# Patient Record
Sex: MALE | Race: OTHER | HISPANIC OR LATINO
[De-identification: names, ages, dates, MRNs, and addresses within clinical notes are randomized per-mention and may not be internally consistent; named-entity substitution may affect disease eponyms.]

---

## 2021-06-17 PROBLEM — K80.20 CHOLELITHIASIS: Status: ACTIVE | Noted: 2021-06-17

## 2021-06-17 PROBLEM — Z00.00 ENCOUNTER FOR PREVENTIVE HEALTH EXAMINATION: Status: ACTIVE | Noted: 2021-06-17

## 2021-06-21 ENCOUNTER — APPOINTMENT (OUTPATIENT)
Dept: SURGERY | Facility: CLINIC | Age: 61
End: 2021-06-21
Payer: MEDICAID

## 2021-06-21 VITALS
SYSTOLIC BLOOD PRESSURE: 149 MMHG | WEIGHT: 182 LBS | HEART RATE: 80 BPM | BODY MASS INDEX: 26.96 KG/M2 | HEIGHT: 69 IN | DIASTOLIC BLOOD PRESSURE: 82 MMHG

## 2021-06-21 DIAGNOSIS — K80.20 CALCULUS OF GALLBLADDER W/OUT CHOLECYSTITIS W/OUT OBSTRUCTION: ICD-10-CM

## 2021-06-21 PROCEDURE — 99204 OFFICE O/P NEW MOD 45 MIN: CPT

## 2021-06-21 RX ORDER — OMEPRAZOLE 20 MG/1
20 CAPSULE, DELAYED RELEASE ORAL
Qty: 30 | Refills: 0 | Status: ACTIVE | COMMUNITY
Start: 2021-03-07

## 2021-06-21 RX ORDER — LOSARTAN POTASSIUM AND HYDROCHLOROTHIAZIDE 25; 100 MG/1; MG/1
100-25 TABLET ORAL
Qty: 30 | Refills: 0 | Status: ACTIVE | COMMUNITY
Start: 2020-11-29

## 2021-06-21 RX ORDER — MOMETASONE FUROATE 1 MG/ML
0.1 SOLUTION TOPICAL
Qty: 30 | Refills: 0 | Status: ACTIVE | COMMUNITY
Start: 2021-05-27

## 2021-06-21 RX ORDER — AMLODIPINE BESYLATE 5 MG/1
5 TABLET ORAL
Qty: 30 | Refills: 0 | Status: ACTIVE | COMMUNITY
Start: 2021-06-05

## 2021-06-21 RX ORDER — ATORVASTATIN CALCIUM 80 MG/1
80 TABLET, FILM COATED ORAL
Qty: 30 | Refills: 0 | Status: ACTIVE | COMMUNITY
Start: 2020-11-29

## 2021-06-21 RX ORDER — OMEGA-3-ACID ETHYL ESTERS CAPSULES 1 G/1
1 CAPSULE, LIQUID FILLED ORAL
Qty: 120 | Refills: 0 | Status: ACTIVE | COMMUNITY
Start: 2020-11-29

## 2021-06-21 RX ORDER — FENOFIBRATE 200 MG/1
200 CAPSULE ORAL
Qty: 30 | Refills: 0 | Status: ACTIVE | COMMUNITY
Start: 2021-06-05

## 2021-06-21 RX ORDER — ERGOCALCIFEROL 1.25 MG/1
1.25 MG CAPSULE, LIQUID FILLED ORAL
Qty: 4 | Refills: 0 | Status: ACTIVE | COMMUNITY
Start: 2020-11-29

## 2021-06-21 NOTE — PLAN
[FreeTextEntry1] : Mr. NILSON THAKKAR  was told significance of findings, options, risks and benefits were explained.  Informed consent for laparoscopic/possible open  cholecystectomy and umbilical hernia repair  and potential risks, benefits and alternatives (surgical options were discussed including non-surgical options or the option of no surgery) to the planned surgery were discussed in depth.  All surgical options were discussed including non-surgical treatments.  He wishes to proceed with surgery.  We will plan for surgery on at the next available date, pending any required insurance pre-certification or pre-approval. He agrees to obtain any necessary pre-operative evaluations and testing prior to surgery. Patient instructed to maintain a fat-free diet, and to seek immediate medical attention with any acute change or worsening of symptoms, including but not limited to abdominal pain, fever, chills, nausea, vomiting, or yellowing of the skin. \par Patient advised to seek immediate medical attention with any acute change in symptoms or with the development of any new or worsening symptoms.  Patient's questions and concerns addressed to patient's satisfaction, and patient verbalized an understanding of the information discussed.

## 2021-06-21 NOTE — CONSULT LETTER
[Dear  ___] : Dear  [unfilled], [Consult Letter:] : I had the pleasure of evaluating your patient, [unfilled]. [Please see my note below.] : Please see my note below. [Consult Closing:] : Thank you very much for allowing me to participate in the care of this patient.  If you have any questions, please do not hesitate to contact me. [Sincerely,] : Sincerely, [FreeTextEntry3] : Jamarcus Canales MD, FACS

## 2021-06-21 NOTE — PHYSICAL EXAM
[Abdominal Masses] : No abdominal masses [Abdomen Tenderness] : ~T ~M No abdominal tenderness [Alert] : alert [Oriented to Person] : oriented to person [Oriented to Place] : oriented to place [Oriented to Time] : oriented to time [Calm] : calm [de-identified] : He  is alert, well-groomed, and in NAD\par  He  is alert, well-groomed, and in NAD\par   [de-identified] : anicteric.  Nasal mucosa pink, septum midline. Oral mucosa pink.  Tongue midline, Pharynx without exudates.\par  anicteric.  Nasal mucosa pink, septum midline. Oral mucosa pink.  Tongue midline, Pharynx without exudates.\par   [de-identified] : Neck supple. Trachea midline. Thyroid isthmus barely palpable, lobes not felt.\par  Neck supple. Trachea midline. Thyroid isthmus barely palpable, lobes not felt.\par   [de-identified] : reducible umbilical hernia, mildly tender, small defect, normal skin

## 2021-06-21 NOTE — DATA REVIEWED
[FreeTextEntry1] : 	\par Exam requested by:\par ENIO GALLARDO MD\par [address]\par [facility] [address]\par SITE PERFORMED: [facility] SITE PHONE: [phone]\par Patient: MARISELA GARCIA\par YOB: 1960\par Phone: [phone]\par MRN: [id number] Acc: [id number]\par Date of Exam: 04-\par  \par EXAM:  ULTRASOUND ABDOMEN COMPLETE\par \par HISTORY:  Male, 60 years-old with epigastric pain\par \par TECHNIQUE:  Using real-time ultrasonography with a high-resolution broadband phased-array curved transducer, multiplanar gray scale images were obtained and supplemented with color Doppler. Static images are provided for review.\par \par COMPARISON:  None.  \par \par FINDINGS:  \par Liver:  The liver is normal in size measuring 14.6 cm, that is coarse liver echotexture identified with increased echogenicity. The visualized portion of portal and hepatic veins are unremarkable. No intrahepatic biliary duct dilatation.\par \par Gallbladder: The gallbladder is contracted and filled with gallstones measuring up to 2.5 cm, there is no pericholecystic fluid, the sonographic Bhatt's sign was negative.\par \par Common Bile Duct: Normal measuring 0.2 cm diameter at the trey hepatis.\par \par Pancreas: The visualized portion of the body of the pancreas is within normal limits. The tail is obscured by overlying bowel gas. No pancreatic duct dilatation.\par \par Kidneys: Normal in size, morphology and cortical echotexture. \par No hydronephrosis, shadowing calculi or perinephric fluid.\par Right Kidney: 12.3 cm in length. \par Left Kidney:   12.9 cm in length. \par \par Spleen: Size at the upper limit of normal, normal contour and echotexture measuring 12.8 cm in length. \par \par Abdominal Aorta/IVC: No evidence of abdominal aortic aneurysm. Visualized portions of the IVC were patent.\par \par IMPRESSION:\par 1. Contracted gallbladder with multiple gallstones, no sonographic signs of acute cholecystitis however to assess for gallbladder function pathology suggest HIDA scan.\par 2. Coarse liver echotexture with increased echogenicity suggestive of fatty infiltration and/or other liver parenchymal disorders, correlation with LFTs is suggested.\par 3. Spleen size at the upper limit of normal.\par \par \par Thank you for the opportunity to participate in the care of this patient.  \par  \par Gauri Rascon MD  - Electronically Signed: 04- 10:07 AM \par Physician to Physician Direct Line is: [phone]\par

## 2021-06-21 NOTE — HISTORY OF PRESENT ILLNESS
[de-identified] : Mr. MARISELA THAKKAR is a 60 year  old patient who was referred by Dr. Tres Jones  with the chief complaint of having right upper quadrant and epigastric pain for  10 years. pain is   radiating to the back.  recently became more symptomatic He reports no nausea or vomiting and no history of jaundice, acholia or choluria.   Appetite is good and weight is stable.   He   has    family history of biliary tract disease in his father, brother and sister..   He had an abdominal sonogram on  04/17/2021 which revealed Contracted gallbladder with multiple gallstones. CBD is normal \par \par

## 2021-07-26 ENCOUNTER — OUTPATIENT (OUTPATIENT)
Dept: OUTPATIENT SERVICES | Facility: HOSPITAL | Age: 61
LOS: 1 days | End: 2021-07-26
Payer: MEDICAID

## 2021-07-26 VITALS
HEART RATE: 74 BPM | DIASTOLIC BLOOD PRESSURE: 80 MMHG | TEMPERATURE: 99 F | HEIGHT: 70 IN | SYSTOLIC BLOOD PRESSURE: 134 MMHG | RESPIRATION RATE: 18 BRPM | OXYGEN SATURATION: 97 % | WEIGHT: 184.97 LBS

## 2021-07-26 DIAGNOSIS — K42.9 UMBILICAL HERNIA WITHOUT OBSTRUCTION OR GANGRENE: ICD-10-CM

## 2021-07-26 DIAGNOSIS — K81.9 CHOLECYSTITIS, UNSPECIFIED: ICD-10-CM

## 2021-07-26 DIAGNOSIS — E78.5 HYPERLIPIDEMIA, UNSPECIFIED: ICD-10-CM

## 2021-07-26 DIAGNOSIS — E55.9 VITAMIN D DEFICIENCY, UNSPECIFIED: ICD-10-CM

## 2021-07-26 DIAGNOSIS — I10 ESSENTIAL (PRIMARY) HYPERTENSION: ICD-10-CM

## 2021-07-26 DIAGNOSIS — Z01.818 ENCOUNTER FOR OTHER PREPROCEDURAL EXAMINATION: ICD-10-CM

## 2021-07-26 DIAGNOSIS — K80.20 CALCULUS OF GALLBLADDER WITHOUT CHOLECYSTITIS WITHOUT OBSTRUCTION: ICD-10-CM

## 2021-07-26 LAB — BLD GP AB SCN SERPL QL: SIGNIFICANT CHANGE UP

## 2021-07-26 PROCEDURE — G0463: CPT

## 2021-07-26 NOTE — H&P PST ADULT - NSICDXPROBLEM_GEN_ALL_CORE_FT
120 PROBLEM DIAGNOSES  Problem: CL (cholelithiasis)  Assessment and Plan: Laparoscopic cholecystectomy with intraoperative cholangiogram, possible open on 08/04/2021. Preoperative instructions discussed with pt and given to pt. Instructed pt to notify security when he arrives in the lobby of the [facility] that he is here for surgery, that he will need someone to come to the hospital to pick him up after surgery,not to eat or drink anything after midnight the night before the surgery, to avoid NSAIDs such as Ibuprofen, Motrin, Aleve, Advil, naproxen before surgery, to take Tylenol if needed for pain, to report if he has been exposed to anyone with any contagious diseases including Covid-19 or if he is exhibiting any symptoms of COVID-19. Instructed about use of Chlorhexidine 4% soap before surgery. Verbalized understanding of instructions given.     Problem: Umbilical hernia without obstruction or gangrene  Assessment and Plan: Umbilical hernia repair on 08/04/2021. Follow-up with Surgeon  for management.     Problem: HTN (hypertension)  Assessment and Plan: Instructed to continue antihypertensive meds and take with sips of water on day of surgery.  Seen for clearance by PCP. Follow-up with PCP for management.     Problem: HLD (hyperlipidemia)  Assessment and Plan: Instructed pt to continue Atorvastatin and to follow-up with PCP for lipid management     Problem: Vitamin D deficiency  Assessment and Plan: Instructed to continue Vitamin D and to follow-up with provider postop for management.

## 2021-07-26 NOTE — H&P PST ADULT - NSICDXFAMILYHX_GEN_ALL_CORE_FT
FAMILY HISTORY:  Father  Still living? Unknown  Family history of hypertension, Age at diagnosis: Age Unknown    Sibling  Still living? Yes, Estimated age: Age Unknown  Family history of hypertension, Age at diagnosis: Age Unknown     FAMILY HISTORY:  Father  Still living? Unknown  Family history of gallstones, Age at diagnosis: Age Unknown  Family history of hypertension, Age at diagnosis: Age Unknown    Sibling  Still living? Yes, Estimated age: Age Unknown  Family history of gallstones, Age at diagnosis: Age Unknown  Family history of hypertension, Age at diagnosis: Age Unknown  Family history of systemic lupus erythematosus, Age at diagnosis: Age Unknown

## 2021-07-26 NOTE — H&P PST ADULT - HISTORY OF PRESENT ILLNESS
This is a 60 yr old male with PMH of HTN, Hyperlipidemia, vitamin D deficiency presents with c/o intermittent abdominal pain due to gallstones and umbilical hernia. Pt reports feeling nauseous before meals and worsening of pain after ingestion of fatty meals. Pt for laparoscopic cholecystectomy with intraoperative cholangiogram, possible open, umbilical hernia repair on 08/04/2021. This is a 60 yr old male with PMH of HTN, Hyperlipidemia, vitamin D deficiency, COVID-19 infection presents with c/o intermittent abdominal pain due to gallstones and umbilical hernia. Pt reports worsening of pain at umbilical area after ingestion of fatty meals. Pt is scheduled for laparoscopic cholecystectomy with intraoperative cholangiogram, possible open, umbilical hernia repair on 08/04/2021.

## 2021-07-26 NOTE — H&P PST ADULT - ASSESSMENT
This is a 60 yr old male with PMH of HTN, Hyperlipidemia, vitamin D deficiency presents with cholelithiasis and umbilical hernia. Pt is scheduled for laparoscopic cholecystectomy with intraoperative cholangiogram, possible open, umbilical hernia repair on 08/04/2021.

## 2021-07-26 NOTE — H&P PST ADULT - CENTRAL VENOUS CATHETER
June 7, 2021     Patient: Garfield Ayon   YOB: 1983   Date of Visit: 6/7/2021       To Whom it May Concern:    Garfield Ayon was seen in my clinic on 6/7/2021 at 2:00 pm.    He has a left ankle injury, I suspect tendonitis.  This is currently exacerbated by walking/weight bearing.  I advise minimize standing activities over the coming few days at least until he can get in to see podiatrist.    Sincerely,         Matthew Lisa MD    Medical information is confidential and cannot be disclosed without the written consent of the patient or his representative.       no

## 2021-07-26 NOTE — H&P PST ADULT - NSICDXPASTMEDICALHX_GEN_ALL_CORE_FT
PAST MEDICAL HISTORY:  Calculus of gallbladder without cholangitis or cholecystitis     HLD (hyperlipidemia)     HTN (hypertension)     Umbilical hernia without obstruction or gangrene     Vitamin D deficiency      PAST MEDICAL HISTORY:  Calculus of gallbladder without cholangitis or cholecystitis     COVID-19 virus infection May 2020    HLD (hyperlipidemia)     HTN (hypertension)     Umbilical hernia without obstruction or gangrene     Vitamin D deficiency

## 2021-07-31 DIAGNOSIS — Z01.818 ENCOUNTER FOR OTHER PREPROCEDURAL EXAMINATION: ICD-10-CM

## 2021-08-01 ENCOUNTER — APPOINTMENT (OUTPATIENT)
Dept: DISASTER EMERGENCY | Facility: CLINIC | Age: 61
End: 2021-08-01

## 2021-08-20 PROBLEM — U07.1 COVID-19: Chronic | Status: ACTIVE | Noted: 2021-07-26

## 2021-08-20 PROBLEM — E78.5 HYPERLIPIDEMIA, UNSPECIFIED: Chronic | Status: ACTIVE | Noted: 2021-07-26

## 2021-08-20 PROBLEM — K80.20 CALCULUS OF GALLBLADDER WITHOUT CHOLECYSTITIS WITHOUT OBSTRUCTION: Chronic | Status: ACTIVE | Noted: 2021-07-26

## 2021-08-20 PROBLEM — K42.9 UMBILICAL HERNIA WITHOUT OBSTRUCTION OR GANGRENE: Chronic | Status: ACTIVE | Noted: 2021-07-26

## 2021-08-20 PROBLEM — E55.9 VITAMIN D DEFICIENCY, UNSPECIFIED: Chronic | Status: ACTIVE | Noted: 2021-07-26

## 2021-08-20 PROBLEM — I10 ESSENTIAL (PRIMARY) HYPERTENSION: Chronic | Status: ACTIVE | Noted: 2021-07-26

## 2021-08-30 ENCOUNTER — OUTPATIENT (OUTPATIENT)
Dept: OUTPATIENT SERVICES | Facility: HOSPITAL | Age: 61
LOS: 1 days | End: 2021-08-30
Payer: MEDICAID

## 2021-08-30 VITALS
WEIGHT: 188.05 LBS | HEART RATE: 69 BPM | DIASTOLIC BLOOD PRESSURE: 70 MMHG | RESPIRATION RATE: 16 BRPM | TEMPERATURE: 99 F | SYSTOLIC BLOOD PRESSURE: 120 MMHG | HEIGHT: 67 IN | OXYGEN SATURATION: 99 %

## 2021-08-30 DIAGNOSIS — K42.9 UMBILICAL HERNIA WITHOUT OBSTRUCTION OR GANGRENE: ICD-10-CM

## 2021-08-30 DIAGNOSIS — I10 ESSENTIAL (PRIMARY) HYPERTENSION: ICD-10-CM

## 2021-08-30 DIAGNOSIS — K81.9 CHOLECYSTITIS, UNSPECIFIED: ICD-10-CM

## 2021-08-30 DIAGNOSIS — Z29.9 ENCOUNTER FOR PROPHYLACTIC MEASURES, UNSPECIFIED: ICD-10-CM

## 2021-08-30 DIAGNOSIS — Z01.818 ENCOUNTER FOR OTHER PREPROCEDURAL EXAMINATION: ICD-10-CM

## 2021-08-30 PROCEDURE — G0463: CPT

## 2021-08-30 NOTE — H&P PST ADULT - LYMPH NODES
Protopic Pregnancy And Lactation Text: This medication is Pregnancy Category C. It is unknown if this medication is excreted in breast milk when applied topically. detailed exam

## 2021-08-30 NOTE — H&P PST ADULT - PROBLEM SELECTOR PLAN 3
Instruction regarding chlorhexidine soap use is given.   Patient verbalized understanding.   Literature also given

## 2021-08-30 NOTE — H&P PST ADULT - HISTORY OF PRESENT ILLNESS
61 yo male with history off HTN, HLD, reports the above.   He is scheduled for : Laparoscopic Cholecystectomy with Intra-Operative Cholangiogram  Possible Open and  Umbilical Hernia Repair, on 9/10/21

## 2021-08-30 NOTE — H&P PST ADULT - RS GEN PE MLT RESP DETAILS PC
respirations non-labored/clear to auscultation bilaterally/no chest wall tenderness/no intercostal retractions

## 2021-08-30 NOTE — H&P PST ADULT - ASSESSMENT
61 yo male is scheduled for :  Laparoscopic Cholecystectomy with Intra-Operative Cholangiogram  Possible Open and  Umbilical Hernia Repair, on 9/10/21

## 2021-08-30 NOTE — H&P PST ADULT - SYMPTOMS
none Advancement Flap (Double) Text: The defect edges were debeveled with a #15 scalpel blade.  Given the location of the defect and the proximity to free margins a double advancement flap was deemed most appropriate.  Using a sterile surgical marker, the appropriate advancement flaps were drawn incorporating the defect and placing the expected incisions within the relaxed skin tension lines where possible.    The area thus outlined was incised deep to adipose tissue with a #15 scalpel blade.  The skin margins were undermined to an appropriate distance in all directions utilizing iris scissors.

## 2021-08-30 NOTE — H&P PST ADULT - NSICDXFAMILYHX_GEN_ALL_CORE_FT
FAMILY HISTORY:  Father  Still living? Unknown  Family history of gallstones, Age at diagnosis: Age Unknown  Family history of hypertension, Age at diagnosis: Age Unknown    Sibling  Still living? Yes, Estimated age: Age Unknown  Family history of gallstones, Age at diagnosis: Age Unknown  Family history of hypertension, Age at diagnosis: Age Unknown  Family history of systemic lupus erythematosus, Age at diagnosis: Age Unknown

## 2021-08-30 NOTE — H&P PST ADULT - NSICDXPASTMEDICALHX_GEN_ALL_CORE_FT
PAST MEDICAL HISTORY:  Calculus of gallbladder without cholangitis or cholecystitis     COVID-19 virus infection May 2020    HLD (hyperlipidemia)     HTN (hypertension)     Umbilical hernia without obstruction or gangrene     Vitamin D deficiency

## 2021-08-30 NOTE — H&P PST ADULT - PROBLEM SELECTOR PLAN 2
Laparoscopic Cholecystectomy with Intra-Operative Cholangiogram  Possible Open and  Umbilical Hernia Repair

## 2021-09-07 ENCOUNTER — APPOINTMENT (OUTPATIENT)
Dept: DISASTER EMERGENCY | Facility: CLINIC | Age: 61
End: 2021-09-07

## 2021-09-08 LAB — SARS-COV-2 N GENE NPH QL NAA+PROBE: NOT DETECTED

## 2021-09-09 ENCOUNTER — TRANSCRIPTION ENCOUNTER (OUTPATIENT)
Age: 61
End: 2021-09-09

## 2021-09-10 ENCOUNTER — OUTPATIENT (OUTPATIENT)
Dept: OUTPATIENT SERVICES | Facility: HOSPITAL | Age: 61
LOS: 1 days | End: 2021-09-10
Payer: MEDICAID

## 2021-09-10 ENCOUNTER — RESULT REVIEW (OUTPATIENT)
Age: 61
End: 2021-09-10

## 2021-09-10 ENCOUNTER — APPOINTMENT (OUTPATIENT)
Dept: SURGERY | Facility: HOSPITAL | Age: 61
End: 2021-09-10
Payer: MEDICAID

## 2021-09-10 VITALS
RESPIRATION RATE: 16 BRPM | OXYGEN SATURATION: 98 % | TEMPERATURE: 99 F | HEART RATE: 85 BPM | DIASTOLIC BLOOD PRESSURE: 73 MMHG | SYSTOLIC BLOOD PRESSURE: 124 MMHG

## 2021-09-10 VITALS
TEMPERATURE: 99 F | DIASTOLIC BLOOD PRESSURE: 76 MMHG | WEIGHT: 188.05 LBS | HEART RATE: 88 BPM | RESPIRATION RATE: 16 BRPM | SYSTOLIC BLOOD PRESSURE: 148 MMHG | HEIGHT: 67 IN | OXYGEN SATURATION: 99 %

## 2021-09-10 DIAGNOSIS — K42.9 UMBILICAL HERNIA WITHOUT OBSTRUCTION OR GANGRENE: ICD-10-CM

## 2021-09-10 DIAGNOSIS — Z01.818 ENCOUNTER FOR OTHER PREPROCEDURAL EXAMINATION: ICD-10-CM

## 2021-09-10 DIAGNOSIS — K81.9 CHOLECYSTITIS, UNSPECIFIED: ICD-10-CM

## 2021-09-10 DIAGNOSIS — K80.20 CALCULUS OF GALLBLADDER WITHOUT CHOLECYSTITIS WITHOUT OBSTRUCTION: ICD-10-CM

## 2021-09-10 LAB — BLD GP AB SCN SERPL QL: SIGNIFICANT CHANGE UP

## 2021-09-10 PROCEDURE — 36415 COLL VENOUS BLD VENIPUNCTURE: CPT

## 2021-09-10 PROCEDURE — 88304 TISSUE EXAM BY PATHOLOGIST: CPT

## 2021-09-10 PROCEDURE — 49585: CPT

## 2021-09-10 PROCEDURE — 88304 TISSUE EXAM BY PATHOLOGIST: CPT | Mod: 26

## 2021-09-10 PROCEDURE — 47563 LAPARO CHOLECYSTECTOMY/GRAPH: CPT | Mod: AS

## 2021-09-10 PROCEDURE — 86900 BLOOD TYPING SEROLOGIC ABO: CPT

## 2021-09-10 PROCEDURE — 86850 RBC ANTIBODY SCREEN: CPT

## 2021-09-10 PROCEDURE — 47563 LAPARO CHOLECYSTECTOMY/GRAPH: CPT

## 2021-09-10 PROCEDURE — 49585: CPT | Mod: AS

## 2021-09-10 PROCEDURE — 86901 BLOOD TYPING SEROLOGIC RH(D): CPT

## 2021-09-10 PROCEDURE — 76000 FLUOROSCOPY <1 HR PHYS/QHP: CPT

## 2021-09-10 RX ORDER — ACETAMINOPHEN 500 MG
2 TABLET ORAL
Qty: 0 | Refills: 0 | DISCHARGE

## 2021-09-10 RX ORDER — FENOFIBRATE,MICRONIZED 130 MG
1 CAPSULE ORAL
Qty: 0 | Refills: 0 | DISCHARGE

## 2021-09-10 RX ORDER — OMEGA-3 ACID ETHYL ESTERS 1 G
1 CAPSULE ORAL
Qty: 0 | Refills: 0 | DISCHARGE

## 2021-09-10 RX ORDER — HYDROMORPHONE HYDROCHLORIDE 2 MG/ML
0.5 INJECTION INTRAMUSCULAR; INTRAVENOUS; SUBCUTANEOUS
Refills: 0 | Status: DISCONTINUED | OUTPATIENT
Start: 2021-09-10 | End: 2021-09-10

## 2021-09-10 RX ORDER — ERGOCALCIFEROL 1.25 MG/1
1 CAPSULE ORAL
Qty: 0 | Refills: 0 | DISCHARGE

## 2021-09-10 RX ORDER — HYDROMORPHONE HYDROCHLORIDE 2 MG/ML
1 INJECTION INTRAMUSCULAR; INTRAVENOUS; SUBCUTANEOUS
Refills: 0 | Status: DISCONTINUED | OUTPATIENT
Start: 2021-09-10 | End: 2021-09-10

## 2021-09-10 RX ORDER — AMLODIPINE BESYLATE 2.5 MG/1
1 TABLET ORAL
Qty: 0 | Refills: 0 | DISCHARGE

## 2021-09-10 RX ORDER — SODIUM CHLORIDE 9 MG/ML
3 INJECTION INTRAMUSCULAR; INTRAVENOUS; SUBCUTANEOUS EVERY 8 HOURS
Refills: 0 | Status: DISCONTINUED | OUTPATIENT
Start: 2021-09-10 | End: 2021-09-10

## 2021-09-10 RX ORDER — ATORVASTATIN CALCIUM 80 MG/1
1 TABLET, FILM COATED ORAL
Qty: 0 | Refills: 0 | DISCHARGE

## 2021-09-10 RX ORDER — LOSARTAN/HYDROCHLOROTHIAZIDE 100MG-25MG
1 TABLET ORAL
Qty: 0 | Refills: 0 | DISCHARGE

## 2021-09-10 RX ADMIN — HYDROMORPHONE HYDROCHLORIDE 0.5 MILLIGRAM(S): 2 INJECTION INTRAMUSCULAR; INTRAVENOUS; SUBCUTANEOUS at 10:37

## 2021-09-10 RX ADMIN — HYDROMORPHONE HYDROCHLORIDE 1 MILLIGRAM(S): 2 INJECTION INTRAMUSCULAR; INTRAVENOUS; SUBCUTANEOUS at 10:12

## 2021-09-10 RX ADMIN — HYDROMORPHONE HYDROCHLORIDE 1 MILLIGRAM(S): 2 INJECTION INTRAMUSCULAR; INTRAVENOUS; SUBCUTANEOUS at 10:39

## 2021-09-10 NOTE — BRIEF OPERATIVE NOTE - NSICDXBRIEFPREOP_GEN_ALL_CORE_FT
PRE-OP DIAGNOSIS:  Symptomatic cholelithiasis 10-Sep-2021 07:42:24  Lucila Murray   PRE-OP DIAGNOSIS:  Symptomatic cholelithiasis 10-Sep-2021 07:42:24  Lucila Murray  Umbilical hernia without obstruction and without gangrene 10-Sep-2021 09:14:41  Lucila Murray

## 2021-09-10 NOTE — ASU DISCHARGE PLAN (ADULT/PEDIATRIC) - PROCEDURE
Elective Laparoscopic cholecystectomy, IOC Elective Laparoscopic cholecystectomy, IOC; Umbilical hernia repair Elective Laparoscopic Cholecystectomy, IOC; Umbilical hernia repair

## 2021-09-10 NOTE — BRIEF OPERATIVE NOTE - NSICDXBRIEFPOSTOP_GEN_ALL_CORE_FT
POST-OP DIAGNOSIS:  Symptomatic cholelithiasis 10-Sep-2021 07:42:28  Lucila Murray   POST-OP DIAGNOSIS:  Symptomatic cholelithiasis 10-Sep-2021 07:42:28  Lucila Murray  Umbilical hernia without obstruction and without gangrene 10-Sep-2021 09:14:46  Lucila Murray

## 2021-09-10 NOTE — ASU DISCHARGE PLAN (ADULT/PEDIATRIC) - CARE PROVIDER_API CALL
Jamarcus Canales)  Surgery  [address] [facility], [place] [address]  Phone: [phone]  Fax: [phone]  Follow Up Time:

## 2021-09-10 NOTE — BRIEF OPERATIVE NOTE - NSICDXBRIEFPROCEDURE_GEN_ALL_CORE_FT
PROCEDURES:  Laparoscopic cholecystectomy with cholangiography 10-Sep-2021 07:42:14  Lucila Murray   PROCEDURES:  Laparoscopic cholecystectomy with cholangiography 10-Sep-2021 07:42:14  Lucila Murray  Open repair of umbilical hernia in adult 10-Sep-2021 09:14:18  Lucila Murray

## 2021-09-14 ENCOUNTER — NON-APPOINTMENT (OUTPATIENT)
Age: 61
End: 2021-09-14

## 2021-09-14 DIAGNOSIS — K42.9 UMBILICAL HERNIA W/OUT OBSTRUCTION OR GANGRENE: ICD-10-CM

## 2021-09-14 DIAGNOSIS — K81.9 CHOLECYSTITIS, UNSPECIFIED: ICD-10-CM

## 2021-09-16 LAB — SURGICAL PATHOLOGY STUDY: SIGNIFICANT CHANGE UP

## 2021-09-23 ENCOUNTER — APPOINTMENT (OUTPATIENT)
Dept: SURGERY | Facility: CLINIC | Age: 61
End: 2021-09-23
Payer: MEDICAID

## 2021-09-23 VITALS — TEMPERATURE: 97 F

## 2021-09-23 PROCEDURE — 99024 POSTOP FOLLOW-UP VISIT: CPT

## 2021-12-14 NOTE — HISTORY OF PRESENT ILLNESS
[de-identified] : Mr. DIMAS  is s/p :Laparoscopic cholecystectomy with operating room cholangiogram and umbilical hernia repair on 09/10/2021.

## 2021-12-20 ENCOUNTER — APPOINTMENT (OUTPATIENT)
Dept: SURGERY | Facility: CLINIC | Age: 61
End: 2021-12-20

## 2023-08-09 NOTE — ASU DISCHARGE PLAN (ADULT/PEDIATRIC) - CLICK TO LAUNCH ORM
Body Location Override (Optional): right superior maryam of antihelix Detail Level: Detailed Add 41106 Cpt? (Important Note: In 2017 The Use Of 67616 Is Being Tracked By Cms To Determine Future Global Period Reimbursement For Global Periods): yes Wound Evaluated By (Optional): Rika Sky MA Wound Diameter In Cm(Optional): 0 Wound Crusting?: crusted Wound Color?: pink Wound Granulation?: early .

## 2025-05-29 ENCOUNTER — APPOINTMENT (OUTPATIENT)
Dept: SURGERY | Facility: CLINIC | Age: 65
End: 2025-05-29
Payer: MEDICAID

## 2025-05-29 VITALS
HEART RATE: 101 BPM | SYSTOLIC BLOOD PRESSURE: 147 MMHG | WEIGHT: 181 LBS | HEIGHT: 67.13 IN | OXYGEN SATURATION: 96 % | DIASTOLIC BLOOD PRESSURE: 85 MMHG | BODY MASS INDEX: 28.08 KG/M2

## 2025-05-29 DIAGNOSIS — K40.90 UNILATERAL INGUINAL HERNIA, W/OUT OBSTRUCTION OR GANGRENE, NOT SPECIFIED AS RECURRENT: ICD-10-CM

## 2025-05-29 PROBLEM — Z86.39 HISTORY OF HYPERLIPIDEMIA: Status: RESOLVED | Noted: 2025-05-29 | Resolved: 2025-05-29

## 2025-05-29 PROBLEM — Z86.79 HISTORY OF HYPERTENSION: Status: RESOLVED | Noted: 2025-05-29 | Resolved: 2025-05-29

## 2025-05-29 PROBLEM — Z63.5 DIVORCE: Status: ACTIVE | Noted: 2025-05-29

## 2025-05-29 PROBLEM — Z87.891 FORMER SMOKER: Status: ACTIVE | Noted: 2025-05-29

## 2025-05-29 PROBLEM — Z82.49 FAMILY HISTORY OF HYPERTENSION: Status: ACTIVE | Noted: 2025-05-29

## 2025-05-29 PROBLEM — Z82.69 FAMILY HISTORY OF SYSTEMIC LUPUS ERYTHEMATOSUS: Status: ACTIVE | Noted: 2025-05-29

## 2025-05-29 PROBLEM — E78.1 HIGH TRIGLYCERIDES: Status: RESOLVED | Noted: 2025-05-29 | Resolved: 2025-05-29

## 2025-05-29 PROCEDURE — 99203 OFFICE O/P NEW LOW 30 MIN: CPT
